# Patient Record
(demographics unavailable — no encounter records)

---

## 2024-10-07 NOTE — HISTORY OF PRESENT ILLNESS
[FreeTextEntry6] : 5 year old here for fluoride varnish application  HPI: They are feeling well today with no fever, respiratory or GI concerns Has been to the dentist   States they brush at least 2 times a day Prior consent from parent obtained

## 2024-10-07 NOTE — DISCUSSION/SUMMARY
[FreeTextEntry1] : Fluoride varnish application Dental screening completed  Plan  Dental screening performed.  Fluoride varnish applied to all tooth surfaces Written and oral post fluoride varnish instructions given.  Do not brush or floss for 6 hours. If possible wait until tomorrow to resume normal oral hygiene. Eat a soft diet for the next 6 hours, nothing crunchy or sticky Avoid hot drinks for the next 6 hours. Anticipatory guidance; dental hygiene. Written/oral information on  general dental care given

## 2024-12-05 NOTE — PHYSICAL EXAM
[NL] : grossly intact [No incision] : no incision [Acute Distress] : no acute distress [Soft] : soft [Tender] : not tender [Distended] : not distended [Normal bowel sounds] : normal bowel sounds [Hepatosplenomegaly] : no hepatosplenomegaly [Rash] : no rash [Jaundice] : no jaundice [TextBox_37] : Reducible umbilical hernia defect measuring 1.3 cm with moderate amount redundant skin

## 2024-12-05 NOTE — ADDENDUM
[FreeTextEntry1] : Documented by Kb Holden acting as a scribe for Dr. Dietz on 12/04/2024.   All medical record entries made by the Scribe were at my, Dr. Dietz, direction and personally dictated by me on 12/04/2024. I have reviewed the chart and agree that the record accurately reflects my personal performances of the history, physical exam, assessment and plan. I have also personally directed, reviewed, and agree with the instructions.

## 2024-12-05 NOTE — HISTORY OF PRESENT ILLNESS
[FreeTextEntry1] : Garry is a 5 year old male here today to be evaluated for a swelling in his umbilicus. The family has appreciated the swelling since birth and note it comes and goes without any issues. He has complained of some occasional discomfort in the past associated with the swelling. He has not had any unexplained fevers or emesis. Garry is otherwise healthy. The pediatrician has appreciated an umbilical hernia and referred the family to me for surgical evaluation. Of note, dad had an umbilical hernia repair performed in the past. The boy is otherwise completely healthy.

## 2024-12-05 NOTE — CONSULT LETTER
[Dear  ___] : Dear  [unfilled], [Consult Letter:] : I had the pleasure of evaluating your patient, [unfilled]. [Please see my note below.] : Please see my note below. [Consult Closing:] : Thank you very much for allowing me to participate in the care of this patient.  If you have any questions, please do not hesitate to contact me. [Sincerely,] : Sincerely, [Courtesy Letter:] : I had the pleasure of seeing your patient, [unfilled], in my office today. [FreeTextEntry2] : Za Carlton MD [FreeTextEntry3] : Kilo Dietz MD Associate Professor of Surgery and Pediatrics [facility] [employer] [facility] at [facility] [phone]

## 2024-12-05 NOTE — ASSESSMENT
[FreeTextEntry1] : Garry is a 5 year old male with a reducible umbilical hernia. He is doing well otherwise and remains asymptomatic at this time. I counseled his mother and offered my reassurance, as the hernia defect is clearly appreciated upon exam today and remains easily reducible. I counseled his mother regarding the issues, options, and expectations surrounding an umbilical hernia. We then reviewed treatment options including surgical repair. Mom understands that given Garry' age and the persistent umbilical hernia, this will unlikely resolve spontaneously in the future, to which I recommended we proceed with a repair. I reviewed the risks, benefits, and alternatives of an umbilical hernia repair, including the need for general anesthesia, bleeding, infection, and recurrent hernia. Postoperative expectations were reviewed. Shashank has indicated her understanding and agrees with the plan to repair. We will schedule this electively. She has my information and knows to contact me sooner with any questions or concerns.

## 2024-12-05 NOTE — CONSULT LETTER
[Dear  ___] : Dear  [unfilled], [Consult Letter:] : I had the pleasure of evaluating your patient, [unfilled]. [Please see my note below.] : Please see my note below. [Consult Closing:] : Thank you very much for allowing me to participate in the care of this patient.  If you have any questions, please do not hesitate to contact me. [Sincerely,] : Sincerely, [Courtesy Letter:] : I had the pleasure of seeing your patient, [unfilled], in my office today. [FreeTextEntry2] : Za Carlton MD [FreeTextEntry3] : Kilo Dietz MD Associate Professor of Surgery and Pediatrics [facility] [employer] [facility] at [facility] Pediatric Surgery [facility] [phone]